# Patient Record
Sex: FEMALE | Race: WHITE | NOT HISPANIC OR LATINO | URBAN - METROPOLITAN AREA
[De-identification: names, ages, dates, MRNs, and addresses within clinical notes are randomized per-mention and may not be internally consistent; named-entity substitution may affect disease eponyms.]

---
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
## 2024-01-03 LAB
SURGICAL PATHOLOGY STUDY: SIGNIFICANT CHANGE UP
SURGICAL PATHOLOGY STUDY: SIGNIFICANT CHANGE UP

## (undated) DEVICE — WARMING BLANKET LOWER ADULT

## (undated) DEVICE — SUT ETHILON 5-0 18" P-3

## (undated) DEVICE — ELCTR BOVIE PENCIL BLADE 10FT

## (undated) DEVICE — DRAPE MEDIUM SHEET 44" X 70"

## (undated) DEVICE — GLV 7 PROTEXIS (WHITE)

## (undated) DEVICE — SUT ETHILON 4-0 18" PS-2

## (undated) DEVICE — SUT NYLON 2-0 18" FS

## (undated) DEVICE — SUT QUILL MONODERM 2-0 30CM 26MM

## (undated) DEVICE — DRAPE MAGNETIC INSTRUMENT MEDIUM

## (undated) DEVICE — SYR ASEPTO

## (undated) DEVICE — PREP CHLORAPREP HI-LITE ORANGE 26ML

## (undated) DEVICE — SUT ETHILON 3-0 18" FS-1

## (undated) DEVICE — SLV COMPRESSION KNEE MED

## (undated) DEVICE — LAP PAD 12 X 12"

## (undated) DEVICE — MARKING PEN W RULER

## (undated) DEVICE — SUT QUILL MONODERM 3-0 14CM 19MM PS-2

## (undated) DEVICE — PACK BREAST

## (undated) DEVICE — SUT SILK 2-0 30" KS

## (undated) DEVICE — DRSG DERMABOND PRINEO 60CM

## (undated) DEVICE — SUT PDS II 3-0 18" PS-2 UNDYED

## (undated) DEVICE — SUT QUILL MONODERM 3-0 30CM PS-2

## (undated) DEVICE — SUT MONOCRYL 3-0 18" PS-2 UNDYED

## (undated) DEVICE — SUT MONOCRYL 4-0 18" PS-2